# Patient Record
Sex: MALE | Race: WHITE | NOT HISPANIC OR LATINO | Employment: FULL TIME | URBAN - METROPOLITAN AREA
[De-identification: names, ages, dates, MRNs, and addresses within clinical notes are randomized per-mention and may not be internally consistent; named-entity substitution may affect disease eponyms.]

---

## 2017-02-01 ENCOUNTER — DOCUMENTATION (OUTPATIENT)
Dept: PHYSICAL THERAPY | Facility: CLINIC | Age: 62
End: 2017-02-01

## 2017-02-01 NOTE — PROGRESS NOTES
Discharge Summary  Discharge Summary from Physical Therapy Report      Dates  PT visit: 07/26/16 - 08/08/16  Number of Visits: 5     Discharge Status of Patient: See MD Note dated 08/08/16    Goals: Not Met    Discharge Plan: Patient to return to referring/providing physician    Comments Patient did not return for any further PT after MD follow-up.    Date of Discharge 02/01/17        Pauline Gómez, PT, DPT  Physical Therapist

## 2017-03-06 ENCOUNTER — ANESTHESIA EVENT (OUTPATIENT)
Dept: PAIN MEDICINE | Facility: HOSPITAL | Age: 62
End: 2017-03-06

## 2017-03-06 RX ORDER — MIDAZOLAM HYDROCHLORIDE 1 MG/ML
1 INJECTION INTRAMUSCULAR; INTRAVENOUS
Status: DISCONTINUED | OUTPATIENT
Start: 2017-03-06 | End: 2017-03-10 | Stop reason: HOSPADM

## 2017-03-06 RX ORDER — METHYLPREDNISOLONE ACETATE 80 MG/ML
80 INJECTION, SUSPENSION INTRA-ARTICULAR; INTRALESIONAL; INTRAMUSCULAR; SOFT TISSUE ONCE
Status: DISCONTINUED | OUTPATIENT
Start: 2017-03-06 | End: 2017-03-10 | Stop reason: HOSPADM

## 2017-03-06 RX ORDER — FENTANYL CITRATE 50 UG/ML
50 INJECTION, SOLUTION INTRAMUSCULAR; INTRAVENOUS
Status: DISCONTINUED | OUTPATIENT
Start: 2017-03-06 | End: 2017-03-10 | Stop reason: HOSPADM

## 2017-03-06 RX ORDER — BUPIVACAINE HYDROCHLORIDE 2.5 MG/ML
30 INJECTION, SOLUTION EPIDURAL; INFILTRATION; INTRACAUDAL ONCE
Status: DISCONTINUED | OUTPATIENT
Start: 2017-03-06 | End: 2017-03-10 | Stop reason: HOSPADM

## 2017-03-06 RX ORDER — LIDOCAINE HYDROCHLORIDE 10 MG/ML
1 INJECTION, SOLUTION INFILTRATION; PERINEURAL ONCE
Status: DISCONTINUED | OUTPATIENT
Start: 2017-03-06 | End: 2017-03-10 | Stop reason: HOSPADM

## 2017-03-06 RX ORDER — SODIUM CHLORIDE 0.9 % (FLUSH) 0.9 %
1-10 SYRINGE (ML) INJECTION AS NEEDED
Status: DISCONTINUED | OUTPATIENT
Start: 2017-03-06 | End: 2017-03-10 | Stop reason: HOSPADM

## 2017-03-06 NOTE — ANESTHESIA PROCEDURE NOTES
PAIN Epidural block    Patient location during procedure: pain clinic  Indication:at surgeon's request and procedure for pain  Performed By  Anesthesiologist: YONY LOU  Preanesthetic Checklist  Completed: patient identified, site marked, surgical consent, pre-op evaluation, timeout performed, IV checked, risks and benefits discussed and monitors and equipment checked  Additional Notes  Dx: Lumbar Neuritis, Lumbar Disc Displacement without myelopathy  80 mg depomedrol in epid  Epidural Block Prep:  Pt Position:prone (prone)  Sterile Tech:cap, gloves, mask and sterile barrier  Monitoring:blood pressure monitoring, EKG and continuous pulse oximetry  Epidural Block Procedure:  Approach:midline  Guidance: fluoroscopy  Location:lumbar  Level:4-5 (interlaminar)  Needle Type:WritePath  Needle Gauge:20  Aspiration:negative  Medications:  Depomedrol:80 mg  Preservative Free Saline:3mL    Post Assessment:  Post-procedure: bandaid.  Pt Tolerance:patient tolerated the procedure well with no apparent complications  Complications:no

## 2017-03-07 ENCOUNTER — ANESTHESIA (OUTPATIENT)
Dept: PAIN MEDICINE | Facility: HOSPITAL | Age: 62
End: 2017-03-07

## 2017-03-07 ENCOUNTER — HOSPITAL ENCOUNTER (OUTPATIENT)
Dept: PAIN MEDICINE | Facility: HOSPITAL | Age: 62
Discharge: HOME | End: 2017-03-07

## 2017-06-04 ENCOUNTER — HOSPITAL ENCOUNTER (EMERGENCY)
Dept: HOSPITAL 23 - SED | Age: 62
Discharge: HOME | End: 2017-06-04
Payer: COMMERCIAL

## 2017-06-04 DIAGNOSIS — Z79.899: ICD-10-CM

## 2017-06-04 DIAGNOSIS — K64.9: Primary | ICD-10-CM

## 2017-06-04 DIAGNOSIS — Z98.890: ICD-10-CM

## 2017-06-04 DIAGNOSIS — I10: ICD-10-CM

## 2021-03-22 ENCOUNTER — BULK ORDERING (OUTPATIENT)
Dept: CASE MANAGEMENT | Facility: OTHER | Age: 66
End: 2021-03-22

## 2021-03-22 DIAGNOSIS — Z23 IMMUNIZATION DUE: ICD-10-CM
